# Patient Record
Sex: FEMALE | URBAN - METROPOLITAN AREA
[De-identification: names, ages, dates, MRNs, and addresses within clinical notes are randomized per-mention and may not be internally consistent; named-entity substitution may affect disease eponyms.]

---

## 2022-10-06 ENCOUNTER — OFFICE VISIT (OUTPATIENT)
Dept: URBAN - METROPOLITAN AREA CLINIC 105 | Facility: CLINIC | Age: 27
End: 2022-10-06
Payer: COMMERCIAL

## 2022-10-06 ENCOUNTER — WEB ENCOUNTER (OUTPATIENT)
Dept: URBAN - METROPOLITAN AREA CLINIC 105 | Facility: CLINIC | Age: 27
End: 2022-10-06

## 2022-10-06 ENCOUNTER — DASHBOARD ENCOUNTERS (OUTPATIENT)
Age: 27
End: 2022-10-06

## 2022-10-06 ENCOUNTER — TELEPHONE ENCOUNTER (OUTPATIENT)
Dept: URBAN - METROPOLITAN AREA CLINIC 105 | Facility: CLINIC | Age: 27
End: 2022-10-06

## 2022-10-06 DIAGNOSIS — R19.4 CHANGE IN BOWEL HABITS: ICD-10-CM

## 2022-10-06 DIAGNOSIS — R19.5 LOOSE STOOLS: ICD-10-CM

## 2022-10-06 DIAGNOSIS — R19.5 MUCUS IN STOOL: ICD-10-CM

## 2022-10-06 DIAGNOSIS — R19.7 DIARRHEA, UNSPECIFIED TYPE: ICD-10-CM

## 2022-10-06 DIAGNOSIS — R10.84 ABDOMINAL CRAMPING, GENERALIZED: ICD-10-CM

## 2022-10-06 DIAGNOSIS — R10.9 ABDOMINAL CRAMPING: ICD-10-CM

## 2022-10-06 PROCEDURE — 99204 OFFICE O/P NEW MOD 45 MIN: CPT | Performed by: INTERNAL MEDICINE

## 2022-10-06 RX ORDER — HYOSCYAMINE SULFATE 0.12 MG/1
1 TABLET TABLET SUBLINGUAL
Qty: 60 | Refills: 1 | OUTPATIENT
Start: 2022-10-06 | End: 2022-12-04

## 2022-10-06 NOTE — HPI-TODAY'S VISIT:
Pt says taht "it hurts every time I eat". she says that it started about the first of the year. pt reports that she gets some lower abdominal cramping. she describes episodes of eating a meal and when she gets back in the car she will have lower abdominal pain. - the abdominal pain can last for an hour or 2 and it eventually goes away on its own. Normal stool pattern is twice a day and a loose soft stool. She says that she is having up to 5 stools a day with loose. she will rarely make a formed stool but ususally is much soft.  having a bowel movement will help the abdominal pain improve. no blood in aki stool. + mucus. having these pain spells about 2-3 times a week.

## 2022-10-07 ENCOUNTER — LAB OUTSIDE AN ENCOUNTER (OUTPATIENT)
Dept: URBAN - METROPOLITAN AREA CLINIC 105 | Facility: CLINIC | Age: 27
End: 2022-10-07

## 2022-10-13 ENCOUNTER — TELEPHONE ENCOUNTER (OUTPATIENT)
Dept: URBAN - METROPOLITAN AREA CLINIC 105 | Facility: CLINIC | Age: 27
End: 2022-10-13

## 2022-10-13 LAB
A/G RATIO: 1.4
ALBUMIN: 4.4
ALKALINE PHOSPHATASE: 78
ALT (SGPT): 21
ANCA SCREEN: NEGATIVE
AST (SGOT): 19
BILIRUBIN, TOTAL: 0.4
BUN/CREATININE RATIO: (no result)
BUN: 8
C-REACTIVE PROTEIN, QUANT: 12.1
CALCIUM: 9.7
CALPROTECTIN, STOOL - QDX: (no result)
CARBON DIOXIDE, TOTAL: 25
CHLORIDE: 103
CREATININE: 0.54
EGFR: 130
FECAL FAT, QUALITATIVE: (no result)
GASTROINTESTINAL PATHOGEN: (no result)
GLOBULIN, TOTAL: 3.1
GLUCOSE: 78
HEMATOCRIT: 41.5
HEMOGLOBIN: 13.8
IMMUNOGLOBULIN A, QN, SERUM: 331
INTERPRETATION: (no result)
MCH: 27.8
MCHC: 33.3
MCV: 83.5
MPV: 11.3
MYELOPEROXIDASE ANTIBODY: <1
PANCREATICELASTASE ELISA, STOOL: (no result)
PLATELET COUNT: 257
POTASSIUM: 4.3
PROTEIN, TOTAL: 7.5
PROTEINASE-3 ANTIBODY: <1
RDW: 12.8
RED BLOOD CELL COUNT: 4.97
SACCHAROMYCES CEREVISIAE AB (ASCA) (IGA): 4.9
SACCHAROMYCES CEREVISIAE AB (ASCA) (IGG): 6.9
SODIUM: 140
T-TRANSGLUTAMINASE (TTG) IGA: <1
WHITE BLOOD CELL COUNT: 7.2

## 2022-10-13 RX ORDER — CIPROFLOXACIN HYDROCHLORIDE 500 MG/1
1 TABLET TABLET, FILM COATED ORAL BID
Qty: 6 TABLET | Refills: 0 | OUTPATIENT
Start: 2022-10-13 | End: 2022-10-16